# Patient Record
Sex: FEMALE | URBAN - METROPOLITAN AREA
[De-identification: names, ages, dates, MRNs, and addresses within clinical notes are randomized per-mention and may not be internally consistent; named-entity substitution may affect disease eponyms.]

---

## 2019-07-11 ENCOUNTER — TELEPHONE (OUTPATIENT)
Dept: GASTROENTEROLOGY | Facility: CLINIC | Age: 59
End: 2019-07-11

## 2019-07-11 ENCOUNTER — MEDICAL CORRESPONDENCE (OUTPATIENT)
Dept: HEALTH INFORMATION MANAGEMENT | Facility: CLINIC | Age: 59
End: 2019-07-11

## 2019-07-11 NOTE — TELEPHONE ENCOUNTER
[facility] Call Center    Phone Message    May a detailed message be left on voicemail: yes    Reason for Call: Other: Pt is referred by Dr Corin Garzon at [facility] to GI for Epigastric Abdominal pain,unintentional weight loss, and chronic nausea. Referral faxed to clinic for review. Thanks!     Action Taken: Message routed to:  [facility] GI Clinic

## 2019-07-22 ENCOUNTER — DOCUMENTATION ONLY (OUTPATIENT)
Dept: GASTROENTEROLOGY | Facility: CLINIC | Age: 59
End: 2019-07-22

## 2019-07-22 NOTE — PROGRESS NOTES
Referring Provider and Clinic: [facility] - Dr. Corin Belle   (referral only no recs attached)     Diagnosis:  Epigastric abd pain     GI notes or primary provider notes related to GI problem:   N     Pathology Reports: N    Recent Lab Reports: N    Radiology Reports (CT/MRI) : N    Endoscopy:  N    Colonoscopy: N        Referral Date: 7/11/19    Date complete records received and sent for review:      Date records scanned into epic:     Provider Review Date:     Date review routed back to sender:     Letter sent:     Notes:

## 2019-07-25 NOTE — TELEPHONE ENCOUNTER
[facility] Call Center    Phone Message    May a detailed message be left on voicemail: yes    Reason for Call: Other: [place] calling to check on status of referral, wanting to know if anything else is needed from them? Please call to discuss     Action Taken: Message routed to:  [facility] [place]

## 2019-08-05 NOTE — PROGRESS NOTES
REFERRAL REVIEW FORM    Is this a second opinion from another GI physician?  (If yes, OK to refer to for an MD only clinic visit)  No    Reason for referral: Epigastric pain, unintentional weight loss, chronic nausea    Date records reviewed: August 5, 2019     Previous work up:      Summary of pertinent GI work-up: None    Recommendation:    Schedule clinic appointment with general GI provider (MD, RAINE or fellow) - Choose this if patient has not been seen by a GI provider for this problem    When to schedule:  Next available slot    Date patient was contacted regarding scheduling:     Comments:   Update: Patient decided to go to [facility]    Will need to be re-reviewed by MD if re-referred to [facility]

## 2019-08-05 NOTE — PROGRESS NOTES
Action Reba 8/5/19 8:33AM    Action Taken Called [facility] to have all pertinent recs faxed, spoke with Lin to check on recs status. Per Lin she states pt she will be following alt GI providers ([facility]) as of 7/30.